# Patient Record
Sex: FEMALE
[De-identification: names, ages, dates, MRNs, and addresses within clinical notes are randomized per-mention and may not be internally consistent; named-entity substitution may affect disease eponyms.]

---

## 2021-02-09 PROBLEM — Z00.00 ENCOUNTER FOR PREVENTIVE HEALTH EXAMINATION: Status: ACTIVE | Noted: 2021-02-09

## 2021-02-10 ENCOUNTER — APPOINTMENT (OUTPATIENT)
Dept: BARIATRICS | Facility: CLINIC | Age: 36
End: 2021-02-10
Payer: COMMERCIAL

## 2021-02-10 VITALS — BODY MASS INDEX: 38.83 KG/M2 | HEIGHT: 62 IN | WEIGHT: 211 LBS

## 2021-02-10 DIAGNOSIS — Z98.51 TUBAL LIGATION STATUS: ICD-10-CM

## 2021-02-10 DIAGNOSIS — E66.01 MORBID (SEVERE) OBESITY DUE TO EXCESS CALORIES: ICD-10-CM

## 2021-02-10 PROCEDURE — 99072 ADDL SUPL MATRL&STAF TM PHE: CPT

## 2021-02-10 PROCEDURE — 99204 OFFICE O/P NEW MOD 45 MIN: CPT

## 2021-02-10 NOTE — END OF VISIT
[FreeTextEntry3] : All medical record entries made by the Scribe were at my, Dr. Martinez's, discretion and personally dictated by me on 02/10/2021. I have reviewed the chart and agree that the record accurately reflects my personal performance of the history, physical exam, assessment and plan. I have also personally directed, reviewed and agreed to the chart.

## 2021-02-10 NOTE — ADDENDUM
[FreeTextEntry1] : This note was written by Michelle Arias on 02/10/2021 acting as scribe for Dr. Martinez.

## 2021-02-10 NOTE — HISTORY OF PRESENT ILLNESS
[Home] : at home, [unfilled] , at the time of the visit. [Medical Office: ([facility])___] : at the medical office located in  [Verbal consent obtained from patient] : the patient, [unfilled] [de-identified] : Alisia Roman is seen for inability to lose weight and severe obesity. She is a 34 y/o F who gives a h/o progressive obesity, has been on multiple self-directed diets without success. She is increasingly frustrated. She is s/p tubal ligation and laparoscopic cholecystectomy. Currently not on any medications. Works as a home health aid.\par

## 2021-02-10 NOTE — ASSESSMENT
[FreeTextEntry1] : Pt with Class II obesity, not on any medications. Feel need to investigate blood work for metabolic disease, US for fatty liver, sleep study for sleep apnea. Also will refer her to medical weight loss for consideration of weight loss medications. She will return following above tests and we will reevaluate to see if she meets surgical criteria.

## 2021-03-17 ENCOUNTER — APPOINTMENT (OUTPATIENT)
Dept: SLEEP CENTER | Facility: HOME HEALTH | Age: 36
End: 2021-03-17